# Patient Record
(demographics unavailable — no encounter records)

---

## 2024-10-21 NOTE — HISTORY OF PRESENT ILLNESS
[de-identified] : ROOPA is a 54 year F who presents for initial visit with left ankle pain. Pain is primarily located at the medial aspect. It began 1 month ago after a twisting injury. Per patient she has sprained the left ankle 2 times in the past (2-3 years ago). Pain resolved with PT at that time. She went to the ER in [facility] and per patient XRs were negative for fracture. She has been taking ibuprofen PRN. Pain is worse at night. She has persistent swelling.  Interval history: Patient states after her last visit she spoke with a family member who is also a physician who recommended she get an MRI.  She states at that point she was instructed to not start physical therapy until there was further imaging and to not use the lace up ankle brace and to use a short walking boot which she has been using.  She was not able to get the MRI until September and has been using the boot and a cane since then without any improvement in her pain.  Here today for further evaluation.

## 2024-10-21 NOTE — DISCUSSION/SUMMARY
[de-identified] : ROOPA is a 54 year F who presents for initial visit with left ankle pain.   Presentation consistent with an acute on chronic inversion ankle sprain.  Patient presents again today for persistent left ankle pain and swelling mostly in the medial aspect consistent with deltoid ligament sprain and spring ligament sprain as well as some mild to moderate discomfort at the midfoot joint also consistent with findings on MRI of third TMT severe arthritis.  Discussed with patient other findings on MRI are likely incidental as she is not symptomatic in these locations.  Again reinforced it is safe to start physical therapy to stop the walking boot use a lace up ankle brace as needed as there is no findings on the MRI indicative of needing continued immobilization or surgical intervention.  Patient also complaining of some mild burning pain in the medial aspect of the ankle.  Plan 1. Prescription provided for PT again at today's visit 2. Lace up brace provided again at today's visit.  Discontinued short walking boot. 3. Home exercise provided 4. RTC in 2-3 month is pain persists.  Can consider Baxters nerve injection medially if no improvement of burning pain in next visit.

## 2024-10-21 NOTE — PHYSICAL EXAM
[de-identified] : Foot/ankle (LEFT)  Inspection Skin: normal Swelling: present medial malleloi Arch: normal Tendon defect: none  Palpation Tenderness: mild-moderate Location: medial malleolus, deltoid ligament  Ankle ROM Dorsiflexion: normal Plantarflexion: normal Eversion: normal Inversion: full but painful Toe flexion: normal Toe extension: normal  Foot Motor Strength Ankle plantarflexion: 5/5 Dorsiflexion: 5/5 Inversion: 5/5 Eversion: 5/5  Sensory index Normal Distal pulses Normal  Stress test Negative: ankle anterior drawer, ankle lateral tilt, subtalar inversion, subtalar eversion   [de-identified] : MRI of left ankle Date: 9/23/2024  Performed at [facility]: Yes Impression:   1.  Attenuation of the superior medial spring and tibiospring ligament with surrounding enhancing periligamentous soft tissue thickening 2.  Mild insertional tendinosis without tear of the posterior tibialis with superimposed tenosynovitis 3.  Small ankle and subtalar effusion with superimposed enhancing mildly thickened synovium 4.  Mild chronic distal Achilles tendinosis 5.  Peripherally enhancing cystlike changes of the navicular and cuboid 6.  Severe third TMT degenerative arthrosis 7.  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

## 2024-12-31 NOTE — DISCUSSION/SUMMARY
[de-identified] : ROOPA is a 54 year F who presents for initial visit with left ankle pain.   Presentation consistent with an acute on chronic inversion ankle sprain.  Overall patient states that physical therapy is helpful when she is going however after she leaves a few days later she feels like the pain returns.  She has trialed shoe inserts at this point which have been somewhat helpful as well.  Her main source of pain she believes is still the midfoot area but also complains of pain medially which can be related to deltoid chronic sprain as well as some neuropathic pain she feels which may be related to Baxters nerve compression.  Also some changes noted at the tibiotalar joint.  Discussed best first step to trial improving her pain is a third TMT joint injection given the severity of arthritis in that area.  Discussed fusion surgery however patient would like to trial conservative measures first.  Plan 1.  Recommended continuing physical therapy 2.  Continue home exercises 3.  Left third TMT joint injection performed today as above 4.  Patient will follow-up in 2 to 3 months can consider intra-articular tibiotalar joint injection versus Baxters nerve injection at that time.  Versus referral to foot and ankle surgeon.

## 2024-12-31 NOTE — HISTORY OF PRESENT ILLNESS
[de-identified] : ROOPA is a 54 year F who presents for initial visit with left ankle pain. Pain is primarily located at the medial aspect. It began 1 month ago after a twisting injury. Per patient she has sprained the left ankle 2 times in the past (2-3 years ago). Pain resolved with PT at that time. She went to the ER in [facility] and per patient XRs were negative for fracture. She has been taking ibuprofen PRN. Pain is worse at night. She has persistent swelling.  Interval history: Patient with intermittent relief with physical therapy however believes it is short-lived and gets pain a few days after she goes to therapy.  Has not been consistent with home exercises.  Is using a shoe insert which is somewhat helpful.  She states there is still multiple areas on the foot that hurt but the worst is the top of the midfoot and would like this addressed at today's visit.

## 2024-12-31 NOTE — PROCEDURE
[de-identified] : Ultrasound Guided Injection   Indication: Ensure placement within the third TMT joint utilizing the [employer] portable ultrasound machine, the Linear 10mm 19-4 MHz transducer, sterile ultrasound gel, ultrasound guidance with the probe in long axis to the joint, utilizing an in-plane approach, was used for the following injection: Injection: LEFT third TMT joint  Indication: Third TMT joint arthritis. A discussion was had with the patient regarding this procedure and all questions were answered. All risks, benefits and alternatives were discussed. These included but were not limited to bleeding, infection, and allergic reaction. A timeout was performed prior to the procedure to ensure proper side.  Chlorhexidine was used to sterilize the skin overlying the radiocarpal joint. A 25-gauge 1 inch needle was used to inject 0.5cc of 0.5% Ropivacaine, 0.5cc 1% Lidocaine, 1cc of 40mg/mL Depo-Medrol into the joint from a posterior approach. A sterile bandage was then applied. The patient tolerated the procedure well and there were no complications.

## 2024-12-31 NOTE — PHYSICAL EXAM
[de-identified] : Foot/ankle (LEFT)  Inspection Skin: normal Swelling: present medial malleloi Arch: normal Tendon defect: none  Palpation Tenderness: mild-moderate Location: medial malleolus, deltoid ligament  Ankle ROM Dorsiflexion: normal Plantarflexion: normal Eversion: normal Inversion: full but painful Toe flexion: normal Toe extension: normal  Foot Motor Strength Ankle plantarflexion: 5/5 Dorsiflexion: 5/5 Inversion: 5/5 Eversion: 5/5  Sensory index Normal Distal pulses Normal  Stress test Negative: ankle anterior drawer, ankle lateral tilt, subtalar inversion, subtalar eversion   [de-identified] : MRI of left ankle Date: 9/23/2024  Performed at [facility]: Yes Impression:   1.  Attenuation of the superior medial spring and tibiospring ligament with surrounding enhancing periligamentous soft tissue thickening 2.  Mild insertional tendinosis without tear of the posterior tibialis with superimposed tenosynovitis 3.  Small ankle and subtalar effusion with superimposed enhancing mildly thickened synovium 4.  Mild chronic distal Achilles tendinosis 5.  Peripherally enhancing cystlike changes of the navicular and cuboid 6.  Severe third TMT degenerative arthrosis 7.  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

## 2025-04-07 NOTE — PHYSICAL EXAM
[de-identified] : Foot/ankle (LEFT)  Inspection Skin: normal Swelling: present medial malleloi Arch: normal Tendon defect: none  Palpation Tenderness: mild-moderate Location: medial malleolus, deltoid ligament  Ankle ROM Dorsiflexion: normal Plantarflexion: normal Eversion: normal Inversion: full but painful Toe flexion: normal Toe extension: normal  Foot Motor Strength Ankle plantarflexion: 5/5 Dorsiflexion: 5/5 Inversion: 5/5 Eversion: 5/5  Sensory index Normal Distal pulses Normal  Stress test Negative: ankle anterior drawer, ankle lateral tilt, subtalar inversion, subtalar eversion   [de-identified] : MRI of left ankle Date: 9/23/2024  Performed at [facility]: Yes Impression:   1.  Attenuation of the superior medial spring and tibiospring ligament with surrounding enhancing periligamentous soft tissue thickening 2.  Mild insertional tendinosis without tear of the posterior tibialis with superimposed tenosynovitis 3.  Small ankle and subtalar effusion with superimposed enhancing mildly thickened synovium 4.  Mild chronic distal Achilles tendinosis 5.  Peripherally enhancing cystlike changes of the navicular and cuboid 6.  Severe third TMT degenerative arthrosis 7.  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

## 2025-04-07 NOTE — DISCUSSION/SUMMARY
[de-identified] : ROOPA is a 54 year F who presents for initial visit with left ankle pain.   Presentation consistent with an acute on chronic inversion ankle sprain.  Attempted to perform repeat injection at the third TMT joint however given significant osteophytes and degradation of joint space unable to inject intra-articularly and injection was aborted.  Discussed given the significant progression and severity of her arthritis as her main pain generator recommend again a consultation with foot and ankle surgeon for possible fusion.  Patient is amenable.  Medication prescribed in the meantime to help with pain.  Plan 1.  Recommended continuing physical therapy 2.  Continue home exercises 3.  Medrol Dosepak prescribed 4.  Referral given to foot and ankle surgery for consultation.

## 2025-04-07 NOTE — HISTORY OF PRESENT ILLNESS
[de-identified] : ROOPA is a 54 year F who presents for initial visit with left ankle pain. Pain is primarily located at the medial aspect. It began 1 month ago after a twisting injury. Per patient she has sprained the left ankle 2 times in the past (2-3 years ago). Pain resolved with PT at that time. She went to the ER in [facility] and per patient XRs were negative for fracture. She has been taking ibuprofen PRN. Pain is worse at night. She has persistent swelling.  Interval history: Patient states she believes she had approximately 2 months of relief after previous third TMT joint injection with moderate to significant pain returning recently that is also more diffuse throughout the foot and ankle.

## 2025-04-07 NOTE — PROCEDURE
[de-identified] : Ultrasound Guided Injection   Indication: Ensure placement within the third TMT joint utilizing the [employer] portable ultrasound machine, the Linear 10mm 19-4 MHz transducer, sterile ultrasound gel, ultrasound guidance with the probe in long axis to the joint, utilizing an in-plane approach, was used for the following injection: Injection: LEFT third TMT joint  Indication: Third TMT joint arthritis. A discussion was had with the patient regarding this procedure and all questions were answered. All risks, benefits and alternatives were discussed. These included but were not limited to bleeding, infection, and allergic reaction. A timeout was performed prior to the procedure to ensure proper side.  Chlorhexidine was used to sterilize the skin overlying the 3rd tmt joint. A 25-gauge 1 inch needle was used to attempt to inject 0.5cc 1% Lidocaine, 1cc of 40mg/mL Depo-Medrol into the joint, however this was unsuccessful and was aborted.  A sterile bandage was then applied. The patient tolerated the procedure well and there were no complications.

## 2025-06-09 NOTE — HISTORY OF PRESENT ILLNESS
[FreeTextEntry1] : The patient is a 55-year-old female chief complaint of left ankle and foot pain.  She states that her symptoms began about 2 to 3 years ago when she was being treated for knee pain.  She then twisted her foot/ankle about 2 years ago.  Her states that radiographs were negative.  She eventually had an MRI.  The treatment included physical therapy, 3 injections and a brace.  She has not had orthotics.  She localizes symptoms along the lateral dorsal aspect of her foot and the medial ankle.  They occur when she stands or walks significantly.  She works at [employer].

## 2025-06-09 NOTE — PHYSICAL EXAM
[de-identified] : Left ankle and foot examination today demonstrates no evidence of a mass.  She has tenderness along the third TMT joint and navicular.  She has good dorsiflexion, plantarflexion, inversion and eversion strength.  She has mild tenderness along the medial ankle.  No signs of an effusion.  No signs of septic arthritis. [de-identified] : A left hindfoot MRI with and without contrast from [facility] radiology report is reviewed.  The impression is as follows: 1.  Attenuation of the superior medial spring and tibial spring ligament with surrounding enhancing periligamentous soft tissue thickening. 2.  Mild insertional tendinosis without tear of the posterior tibialis with superimposed tenosynovitis. 3.  Small ankle and subtalar effusions with superimposed enhancing mildly thickened synovium. 4.  Mild chronic distal Achilles tendinosis. 5.  Peripherally enhancing cystlike changes of the navicular and cuboid. 6.  Severe third TMT degenerative arthrosis. 7.  Proximal central cord plantar fascia apathy localized to the hindfoot just distal to the calcaneal origin.  Left ankle radiographs (AP/lateral/oblique) were obtained today Indication-pain Radiographs demonstrate no evidence of fracture or significant arthritis.  Cystic changes are noted in the navicular on the lateral radiograph. These radiographs were interpreted and reviewed with the patient.  Left foot radiographs (AP/lateral/oblique) were obtained today Indication-pain Radiographs demonstrate third TMT joint arthrosis and cystic changes in the navicular.. These radiographs were interpreted and reviewed with the patient.

## 2025-06-09 NOTE — ASSESSMENT
[FreeTextEntry1] : Patient has persistent pain. Most likely cause is the third TMT joint arthritis.  This is based on examination and imaging.  The cystic changes in the navicular appear to be benign.  There are some pain in this location as well.  Since the MRI is 9 months old, I recommend a new MRI with and without contrast for comparison.  Medial symptoms may be related to the spring ligament changes noted on the MRI with posterior tibial tendinosis. Her previous treatment has been noted and symptoms persist.  She will obtain the MRI and follow-up me afterwards.  The possibility of custom orthotics and other treatment options can be considered depending on the new MRI.